# Patient Record
Sex: MALE | Race: WHITE | URBAN - METROPOLITAN AREA
[De-identification: names, ages, dates, MRNs, and addresses within clinical notes are randomized per-mention and may not be internally consistent; named-entity substitution may affect disease eponyms.]

---

## 2024-03-12 ENCOUNTER — APPOINTMENT (OUTPATIENT)
Dept: NEUROLOGY | Facility: HOSPITAL | Age: 69
End: 2024-03-12
Payer: MEDICARE

## 2024-03-13 ENCOUNTER — OFFICE VISIT (OUTPATIENT)
Dept: NEUROLOGY | Facility: HOSPITAL | Age: 69
End: 2024-03-13
Payer: MEDICARE

## 2024-03-13 VITALS
SYSTOLIC BLOOD PRESSURE: 129 MMHG | DIASTOLIC BLOOD PRESSURE: 83 MMHG | WEIGHT: 165 LBS | TEMPERATURE: 97.1 F | RESPIRATION RATE: 18 BRPM | HEART RATE: 67 BPM | BODY MASS INDEX: 25.01 KG/M2 | HEIGHT: 68 IN

## 2024-03-13 DIAGNOSIS — R42 DIZZINESS: Primary | ICD-10-CM

## 2024-03-13 PROCEDURE — 99205 OFFICE O/P NEW HI 60 MIN: CPT

## 2024-03-13 PROCEDURE — 1160F RVW MEDS BY RX/DR IN RCRD: CPT

## 2024-03-13 PROCEDURE — 1159F MED LIST DOCD IN RCRD: CPT

## 2024-03-13 PROCEDURE — G2211 COMPLEX E/M VISIT ADD ON: HCPCS | Performed by: STUDENT IN AN ORGANIZED HEALTH CARE EDUCATION/TRAINING PROGRAM

## 2024-03-13 PROCEDURE — 1036F TOBACCO NON-USER: CPT

## 2024-03-13 PROCEDURE — 99215 OFFICE O/P EST HI 40 MIN: CPT | Mod: GC

## 2024-03-13 PROCEDURE — 1126F AMNT PAIN NOTED NONE PRSNT: CPT

## 2024-03-13 RX ORDER — VALACYCLOVIR HYDROCHLORIDE 500 MG/1
500 TABLET, FILM COATED ORAL AS NEEDED
COMMUNITY

## 2024-03-13 RX ORDER — LISINOPRIL 20 MG/1
20 TABLET ORAL DAILY
COMMUNITY

## 2024-03-13 RX ORDER — TRAMADOL HYDROCHLORIDE 50 MG/1
TABLET ORAL
COMMUNITY

## 2024-03-13 RX ORDER — DEXTROAMPHETAMINE SACCHARATE, AMPHETAMINE ASPARTATE, DEXTROAMPHETAMINE SULFATE AND AMPHETAMINE SULFATE 2.5; 2.5; 2.5; 2.5 MG/1; MG/1; MG/1; MG/1
10 TABLET ORAL 2 TIMES DAILY
COMMUNITY

## 2024-03-13 RX ORDER — DICLOFENAC SODIUM 50 MG/1
50 TABLET, DELAYED RELEASE ORAL 2 TIMES DAILY
COMMUNITY

## 2024-03-13 RX ORDER — VITAMIN E MIXED 400 UNIT
CAPSULE ORAL DAILY
COMMUNITY

## 2024-03-13 RX ORDER — TESTOSTERONE CYPIONATE 1000 MG/10ML
INJECTION, SOLUTION INTRAMUSCULAR
COMMUNITY

## 2024-03-13 RX ORDER — METOPROLOL SUCCINATE 50 MG/1
50 TABLET, EXTENDED RELEASE ORAL 2 TIMES DAILY
COMMUNITY

## 2024-03-13 RX ORDER — PREDNISONE 5 MG/1
5 TABLET ORAL DAILY PRN
COMMUNITY

## 2024-03-13 ASSESSMENT — ENCOUNTER SYMPTOMS
LOSS OF SENSATION IN FEET: 0
OCCASIONAL FEELINGS OF UNSTEADINESS: 0
DEPRESSION: 0

## 2024-03-13 ASSESSMENT — PAIN SCALES - GENERAL: PAINLEVEL: 0-NO PAIN

## 2024-03-13 NOTE — PATIENT INSTRUCTIONS
Thank you for your visit today. You were seen by Dr. Chaidez for dizziness, possibly related to BPPV though this was not seen on exam today. If you have any questions or need to reach me, call my office at [phone].    We discussed the following plan today:  Vestibular therapy referral  Return in 4 months

## 2024-03-13 NOTE — PROGRESS NOTES
"Subjective     Ezequiel Valderrama is a right handed  68 y.o. year old male who presents with No chief complaint on file..   Visit type: new patient visit     3 years ago he had a bout of episodic vertigo which he was told is related to BPPV, this improved with self-Epley maneuvers. Review of [facility] note from March 2021 noted positive larissa-hallpike on left. He was rid of vertigo for 3 years ago but it came back 3 months ago. Right now when it is severe it feels like lightheadedness without spinning. The most severe portion lasts for a few ~20 seconds at a time and is followed by a more prolonged period of mild unsteadiness. It is brought on the most by tilting head forward. It is not caused by turning head in bed nor looking upwards. He does not get any other symptoms besides nausea. This has been occurring daily for past 3 months. He denies orthostasis.    He has constant high pitched tinnitus which may be a separate issue. He has right-sided hearing loss. He had audiology at [facility] which showed \"high-frequency sensorineural hearing loss, with the very good speech discrimination\". 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 No aural fullness. He denies oscillopsia. He does get very mild headaches, no hx of migraine. There is no concurrent photophobia, phonophobia, or visual aura.    He has chronic numbness in his toes. The distribution has been stable/nonprogressive. Saw Dr. Lincoln for MCI and small-fiber neuropathy as well as DHAVAL treated with CPAP. He has a previous history of alcoholism for 20 years but has been sober past 10 years.    He sees a rheumatologist for symptoms of arthralgia, at one point told he has something ?autoimmune but not on treatment.    MRI brain [facility] Dec 2023 was reviewed showing low burden of small vessel ischemic changes, mild global atrophy. There may be a vascular loop abutting left CN7/8 complex but slices are too thick to be sure.    There is no problem list on file for this " "patient.     No past medical history on file.   No past surgical history on file.   Social History     Socioeconomic History    Marital status: Single     Spouse name: Not on file    Number of children: Not on file    Years of education: Not on file    Highest education level: Not on file   Occupational History    Not on file   Tobacco Use    Smoking status: Never    Smokeless tobacco: Former     Types: Chew   Substance and Sexual Activity    Alcohol use: Not on file    Drug use: Not on file    Sexual activity: Not on file   Other Topics Concern    Not on file   Social History Narrative    Not on file     Social Determinants of Health     Financial Resource Strain: Not on file   Food Insecurity: Not on file   Transportation Needs: Not on file   Physical Activity: Not on file   Stress: Not on file   Social Connections: Not on file   Intimate Partner Violence: Not on file   Housing Stability: Not on file      No family history on file.              Review of Systems  All other system have been reviewed and are negative for complaint.    Vitals:    03/13/24 1241   BP: 129/83   Pulse: 67   Resp: 18   Temp: 36.2 °C (97.1 °F)   Weight: 74.8 kg (165 lb)   Height: 1.727 m (5' 8\")     Objective   Neurological Exam  Mental Status  Awake, alert and oriented to person, place and time. Speech is normal. Language is fluent with no aphasia. Attention and concentration are normal.    Cranial Nerves  CN II: Visual fields full to confrontation.  CN III, IV, VI: Extraocular movements intact bilaterally. Normal saccades. Normal smooth pursuit. Normal lids and orbits bilaterally. Pupils equal round and reactive to light bilaterally.  CN V:  Right: Facial sensation is normal.  Left: Facial sensation is normal on the left.  CN VII: Full and symmetric facial movement.  CN VIII: Hearing is normal.  CN IX, X: Palate elevates symmetrically  CN XI: Shoulder shrug strength is normal.  CN XII: Tongue midline without atrophy or " fasciculations.  VOR normal  Anastasia-hallpike normal  Supine head roll normal  Forward head tilt normal.    Motor  Normal muscle bulk throughout. No fasciculations present. Normal muscle tone. No abnormal involuntary movements.                                               Right                     Left   Shoulder abduction               5                          5  Elbow flexion                         5                          5  Elbow extension                    5                          5  Finger flexion                         5                          5  Finger extension                    5                          5  Finger abduction                    5                          5  Hip flexion                              5                          5  Knee flexion                           5                          5  Plantarflexion                         5                          5  Dorsiflexion                            5                          5    Sensory  Light touch is normal in upper and lower extremities. Normal vibration in distal lower extremities.     Reflexes                                            Right                      Left  Biceps                                 1+                         1+  Triceps                                1+                         1+  Patellar                                1+                         1+  Achilles                                0                         0  Right Plantar: downgoing  Left Plantar: downgoing    Coordination  Right: Finger-to-nose normal. Heel-to-shin normal.Left: Finger-to-nose normal. Heel-to-shin normal.    Gait  Casual gait is normal including stance, stride, and arm swing. Normal tandem gait. Romberg is absent.                                   Assessment/Plan   Diagnoses and all orders for this visit:  Dizziness  -     Referral to Physical Therapy; Future  -     Follow Up In Neurology; Future    Ezequiel Valderrama is a 68 y.o.  year old male with HTN, ADHD, MCI, previous history of alcoholism, possible small fiber neuropathy, DHAVAL on CPAP, previous left BPPV, bilateral sensorineural hearing here for evaluation of episodic dizziness. The description may be consistent with recurrent BPPV, however his exam today is unremarkable. I discussed it may remit spontaneously or with treatment. MRI brain [facility] Dec 2023 was reviewed showing low burden of small vessel ischemic changes, mild global atrophy. There may be a vascular loop abutting left CN7/8 complex but slices are too thick to be sure.  is another consideration and may trial lacosamide if the episodes persist.

## 2024-03-19 ENCOUNTER — HOSPITAL ENCOUNTER (OUTPATIENT)
Dept: RADIOLOGY | Facility: EXTERNAL LOCATION | Age: 69
Discharge: HOME | End: 2024-03-19

## 2024-04-10 ENCOUNTER — CLINICAL SUPPORT (OUTPATIENT)
Dept: PHYSICAL THERAPY | Facility: CLINIC | Age: 69
End: 2024-04-10
Payer: MEDICARE

## 2024-04-10 DIAGNOSIS — R42 DIZZINESS: ICD-10-CM

## 2024-04-10 PROCEDURE — 97110 THERAPEUTIC EXERCISES: CPT | Mod: GP | Performed by: PHYSICAL THERAPIST

## 2024-04-10 PROCEDURE — 97161 PT EVAL LOW COMPLEX 20 MIN: CPT | Mod: GP | Performed by: PHYSICAL THERAPIST

## 2024-04-10 ASSESSMENT — ENCOUNTER SYMPTOMS
LOSS OF SENSATION IN FEET: 0
OCCASIONAL FEELINGS OF UNSTEADINESS: 0
DEPRESSION: 0

## 2024-04-10 ASSESSMENT — PATIENT HEALTH QUESTIONNAIRE - PHQ9
SUM OF ALL RESPONSES TO PHQ QUESTIONS 1-9: 14
5. POOR APPETITE OR OVEREATING: NOT AT ALL
4. FEELING TIRED OR HAVING LITTLE ENERGY: MORE THAN HALF THE DAYS
7. TROUBLE CONCENTRATING ON THINGS, SUCH AS READING THE NEWSPAPER OR WATCHING TELEVISION: MORE THAN HALF THE DAYS
6. FEELING BAD ABOUT YOURSELF - OR THAT YOU ARE A FAILURE OR HAVE LET YOURSELF OR YOUR FAMILY DOWN: MORE THAN HALF THE DAYS
9. THOUGHTS THAT YOU WOULD BE BETTER OFF DEAD, OR OF HURTING YOURSELF: NOT AT ALL
2. FEELING DOWN, DEPRESSED OR HOPELESS: MORE THAN HALF THE DAYS
10. IF YOU CHECKED OFF ANY PROBLEMS, HOW DIFFICULT HAVE THESE PROBLEMS MADE IT FOR YOU TO DO YOUR WORK, TAKE CARE OF THINGS AT HOME, OR GET ALONG WITH OTHER PEOPLE: SOMEWHAT DIFFICULT
SUM OF ALL RESPONSES TO PHQ9 QUESTIONS 1 AND 2: 5
3. TROUBLE FALLING OR STAYING ASLEEP OR SLEEPING TOO MUCH: SEVERAL DAYS
1. LITTLE INTEREST OR PLEASURE IN DOING THINGS: NEARLY EVERY DAY
8. MOVING OR SPEAKING SO SLOWLY THAT OTHER PEOPLE COULD HAVE NOTICED. OR THE OPPOSITE, BEING SO FIGETY OR RESTLESS THAT YOU HAVE BEEN MOVING AROUND A LOT MORE THAN USUAL: MORE THAN HALF THE DAYS

## 2024-04-10 ASSESSMENT — PAIN SCALES - GENERAL: PAINLEVEL_OUTOF10: 0 - NO PAIN

## 2024-04-10 ASSESSMENT — PAIN - FUNCTIONAL ASSESSMENT: PAIN_FUNCTIONAL_ASSESSMENT: 0-10

## 2024-04-10 NOTE — PROGRESS NOTES
Physical Therapy    Physical Therapy Vertigo Evaluation    Patient Name: Ezequiel Valderrama  MRN: [id number]  Today's Date: 4/10/2024  Time Calculation  Start Time: 1620  Stop Time: 1720  Time Calculation (min): 60 min  PT Evaluation Time Entry  PT Evaluation (Low) Time Entry: 50  PT Therapeutic Procedures Time Entry  Therapeutic Exercise Time Entry: 10  Payor: SAMYMARILYN MEDICARE / Plan: AET MEDICARE VALUE PLAN / Product Type: *No Product type* /     Reason for Referral: vertigo  General Comment: [place]    Current Problem  Problem List Items Addressed This Visit             ICD-10-CM    Dizziness R42    Relevant Orders    Follow Up In Physical Therapy          Precautions  Precautions  STEADI Fall Risk Score (The score of 4 or more indicates an increased risk of falling): 0  Precautions Comment: none       Pain  Pain Assessment: 0-10  Pain Score: 0 - No pain    SUBJECTIVE:   Chief complaint:  Pt reports he had an onset of vertigo about 2-3 months ago. Notes symptoms of imbalance and a sense of movement. Notes symptoms are with him constantly. Denies any falls at this time. Note she had hx of vertigo a few years ago when getting in out of bed and performed self Epley's and resolved his issues. No vertigo since then until 3 months ago. Notes eye movements and head movements will cause his symptoms too worsen. No issues with positional changes at this time.     Vertigo Better: sitting     Vertigo Worse: walking/standing     Imaging: MRI brain [facility] Dec 2023 was reviewed showing low burden of small vessel ischemic changes, mild global atrophy. There may be a vascular loop abutting left CN7/8 complex but slices are too thick to be sure.     Prior level of function: No prior vertigo or imbalance   Current limitations: walking, driving, imbalance     Home setup: reviewed an no concern     Work: retired    Patients goal: eliminate vertigo     Prior tx: no prior PT tx this year.     Medical hx has been reviewed with the patient.      OBJECTIVE:  Posture: WNL    ROM: WNL    Vertigo:   BPPV Test Right Left  Symptoms Nystagmus Direction Latency   (in sec) Duration (in sec)   Thrust neg neg       Hallpike [place] neg neg       Roll test neg neg         Oculomotor exam:  Test Abnormal Y/N Symptoms  Dizziness Rating (0-10)   Smooth Pursuits  N     Saccades Horizontal N     Saccades Vertical N     VOR-Horizontal Y Vertigo/Unsteadiness 2-3/10    VOR-Vertical Y Vertigo/unsteadiness 2/10   Head Shake horizontal N     Head Shake Vertical  N       Spontaneous Nystagmus: neg   Gaze evoked nystagmus: neg     ModCTSIB: 120/120-increased sway and condition 3 and 4     FGA - Functional Gait Assessment  Gait level surface: 2  Change in gait speed: 3  Gait with horizontal head turns: 2  Gait with vertical head turns: 2  Gait and pivot turn: 3  Step over obstacle: 3  Gait with narrow base of support: 2  Gait with eyes closed: 2  Ambulating backwards: 3  Steps: 3  FGA Total Score: 25    Other Measures  Dizziness Handicap Inventory: 58/100     TREATMENT:  Eval  Access Code: [email or web address]  URL: [email or web address]  Date: 04/10/2024  Prepared by: PILY Duogn    Exercises  - Seated Horizontal Saccades  - 2 x daily - 7 x weekly - 1 sets - 3 reps - 30 sec hold  - Seated Vertical Saccades  - 2 x daily - 7 x weekly - 1 sets - 3 reps - 30 sec hold  - Seated Gaze Stabilization with Head Rotation  - 2 x daily - 7 x weekly - 1 sets - 3 reps - 30 sec  hold  - Seated Gaze Stabilization with Head Nod  - 2 x daily - 7 x weekly - 1 sets - 3 reps - 30 sec hold  - Romberg Stance  - 2 x daily - 7 x weekly - 1 sets - 3 reps - 30 sec hold  - Seated to Fold Over Vestibular Habituation  - 2 x daily - 7 x weekly - 2 sets - 5 reps    ASSESSEMENT:  Pt is a 68 yr old referred to therapy for a dx of vertigo by Dr. Jose Chaidez. Pt presents with signs and symptoms of dizziness with positive testing and symptoms reproduction with oculomotor and balance tests in the clinic.  This patient would benefit from a vestibular physical therapy program to restore priro level of function, reduce vertigo like symptoms and improve gait/balance.   Complexity: Low  Clinical presentation: Stable and/or uncomplicated characteristics  The physical therapy prognosis is good for the patient to achieve their goals.   The pt tolerated therapy treatment today well with no adverse effects.  Personal Factors/Barriers to therapy include:  anxiety/depression     PLAN:  The pt will be seen 1 days a week for 6-8 weeks.      The pt has been educated about the risks and benefits of physical therapy including manual therapy treatments. Pt agrees with POC and gives consent for treatment.     The patient will benefit from physical therapy treatment to include: therapeutic exercises, therapeutic activities, neurological re-education, and a home exercise program.     Goals:  Active       PT Problem       Pt to report reduced vertigo symptoms by 25-50% or more with all prior movements and ADL's.        Start:  04/10/24    Expected End:  05/01/24            Pt to report reduced vertigo symptoms by 75% or more with all prior movements and ADL's.        Start:  04/10/24    Expected End:  05/22/24            Reduce DHI by 10 to 20 points to display improved emotional, physical and functional aspects of vertigo        Start:  04/10/24    Expected End:  05/22/24            Pt to increase FGA score to > 28 points to display overall improve gait and balance.         Start:  04/10/24    Expected End:  05/22/24            Pt to be independent with a HEP for self management.        Start:  04/10/24    Expected End:  05/22/24

## 2024-04-16 ENCOUNTER — TREATMENT (OUTPATIENT)
Dept: PHYSICAL THERAPY | Facility: CLINIC | Age: 69
End: 2024-04-16
Payer: MEDICARE

## 2024-04-16 DIAGNOSIS — R42 DIZZINESS: Primary | ICD-10-CM

## 2024-04-16 PROCEDURE — 97112 NEUROMUSCULAR REEDUCATION: CPT | Mod: GP | Performed by: PHYSICAL THERAPIST

## 2024-04-16 ASSESSMENT — PAIN SCALES - GENERAL: PAINLEVEL_OUTOF10: 0 - NO PAIN

## 2024-04-16 NOTE — PROGRESS NOTES
Physical Therapy    Physical Therapy Treatment    Patient Name: Ezequiel Valderrama  MRN: [id number]  Today's Date: 4/16/2024  Time Calculation  Start Time: 1045  Stop Time: 1130  Time Calculation (min): 45 min  PT Therapeutic Procedures Time Entry  Neuromuscular Re-Education Time Entry: 40  Therapeutic Exercise Time Entry: 5  Payor: SAMYDILANMARILYN MEDICARE / Plan: AETNA MEDICARE VALUE PLAN / Product Type: *No Product type* /     Reason for Referral: vertigo  General Comment: Visit 2 [place]    Current Problem    Problem List Items Addressed This Visit             ICD-10-CM    Dizziness - Primary R42        Subjective   Pt reports overall feeling about the same. Notes no issues with the HEP. Notes getting symptoms more in the evenings.   Compliance with HEP-yes     Precautions  Precautions  Precautions Comment: none    Pain  Pain Score: 0 - No pain      Objective   No measures today     Treatments:  There-ex:  Nu-step x 5 min    Neuro re-ed:  Airex march x 2 min  Airex tandem stand x 1 min  Airex step on/off forward 10 x ea R/L  Airex step on/off lateral 10 x ea R/L  Romberg eyes closed 30 sec x 3   1/4 turns  4 x R/L x 2   Diagonal bends 2 x 5 reps R/L each   Tandem walk at rail 10 ft x 4   Retro walk 10 ft x 4  Saccades standing romberg 30 sec x 3 ea horizontal/vertical   Gaze stability standing romberg 30 sec x 3 ea horizontal/vertical  CVOR 30 sec x 3     Updated HEP:  Access Code: [email or web address]  URL: [email or web address]  Date: 04/16/2024  Prepared by: PILY Duong    Exercises  - Seated Horizontal Saccades  - 2 x daily - 7 x weekly - 1 sets - 3 reps - 30 sec hold  - Seated Vertical Saccades  - 2 x daily - 7 x weekly - 1 sets - 3 reps - 30 sec hold  - Seated Gaze Stabilization with Head Rotation  - 2 x daily - 7 x weekly - 1 sets - 3 reps - 30 sec  hold  - Seated Gaze Stabilization with Head Nod  - 2 x daily - 7 x weekly - 1 sets - 3 reps - 30 sec hold  - Romberg Stance  - 2 x daily - 7 x weekly - 1 sets - 3 reps -  30 sec hold  - Seated to Fold Over Vestibular Habituation  - 2 x daily - 7 x weekly - 2 sets - 5 reps  - Standing VOR Cancellation  - 2 x daily - 7 x weekly - 1 sets - 3 reps - 30 sec hold  - Tandem Stance  - 2 x daily - 7 x weekly - 1 sets - 2 reps - 30 sec  hold  - Romberg Stance with Eyes Closed  - 1 x daily - 7 x weekly - 1 sets - 3 reps - 30 sec hold     Assessment:  Pt overall is progressing with his therapy program and noted with less symptoms with exercises performed today. Provided updated Hep and recommended to continue with HEP as directed.     Plan:  Continue to progress program as tolerated.     Goals:  Active       PT Problem       Pt to report reduced vertigo symptoms by 25-50% or more with all prior movements and ADL's.        Start:  04/10/24    Expected End:  05/01/24            Pt to report reduced vertigo symptoms by 75% or more with all prior movements and ADL's.        Start:  04/10/24    Expected End:  05/22/24            Reduce DHI by 10 to 20 points to display improved emotional, physical and functional aspects of vertigo        Start:  04/10/24    Expected End:  05/22/24            Pt to increase FGA score to > 28 points to display overall improve gait and balance.         Start:  04/10/24    Expected End:  05/22/24            Pt to be independent with a HEP for self management.        Start:  04/10/24    Expected End:  05/22/24

## 2024-04-25 ENCOUNTER — TREATMENT (OUTPATIENT)
Dept: PHYSICAL THERAPY | Facility: CLINIC | Age: 69
End: 2024-04-25
Payer: MEDICARE

## 2024-04-25 DIAGNOSIS — R42 DIZZINESS: Primary | ICD-10-CM

## 2024-04-25 PROCEDURE — 97112 NEUROMUSCULAR REEDUCATION: CPT | Mod: GP | Performed by: PHYSICAL THERAPIST

## 2024-04-25 ASSESSMENT — PAIN SCALES - GENERAL: PAINLEVEL_OUTOF10: 0 - NO PAIN

## 2024-04-25 NOTE — PROGRESS NOTES
Physical Therapy    Physical Therapy Treatment    Patient Name: Ezequiel Valderrama  MRN: [id number]  Today's Date: 4/25/2024  Time Calculation  Start Time: 1230  Stop Time: 1315  Time Calculation (min): 45 min  PT Therapeutic Procedures Time Entry  Neuromuscular Re-Education Time Entry: 40  Therapeutic Exercise Time Entry: 5  Payor: SAMYDILANMARILYN MEDICARE / Plan: AETNA MEDICARE VALUE PLAN / Product Type: *No Product type* /     Reason for Referral: vertigo  General Comment: Visit 3 [place]    Current Problem    Problem List Items Addressed This Visit             ICD-10-CM    Dizziness - Primary R42      Subjective   Pt overall notes he feels he is improving and that the exercises   Compliance with HEP-yes    Precautions  Precautions  Precautions Comment: none    Pain  Pain Score: 0 - No pain    Objective   No measures     Treatments:  There-ex:  Nu-step x 5 min    Neuro re-ed:  Airex march x 2 min  Airex tandem stand x 1 min  Airex step on/off forward 10 x ea R/L  Airex step on/off lateral 10 x ea R/L  Airex on/off step up backward 10 x ea R/L   Romberg eyes closed 30 sec x 3 Airex   1/4 turns  4 x R/L x 2   Diagonal bends 2 x 5 reps R/L each   Tandem walk at rail 10 ft x 4   Retro walk 10 ft x 4  Saccades standing romberg 30 sec x 3 ea horizontal/vertical -HEP  Gaze stability standing romberg 30 sec x 3 ea horizontal/vertical- HEP  Walk with head turns R/L 20ft x 4  Walk with head nods up/down 20ft x 4   CVOR 30 sec x 3     Updated HEP:  Access Code: [email or web address]  URL: [email or web address]  Date: 04/25/2024  Prepared by: PILY Duong    Exercises  - Seated Horizontal Saccades  - 2 x daily - 7 x weekly - 1 sets - 3 reps - 30 sec hold  - Seated Vertical Saccades  - 2 x daily - 7 x weekly - 1 sets - 3 reps - 30 sec hold  - Seated Gaze Stabilization with Head Rotation  - 2 x daily - 7 x weekly - 1 sets - 3 reps - 30 sec  hold  - Seated Gaze Stabilization with Head Nod  - 2 x daily - 7 x weekly - 1 sets - 3 reps - 30 sec  hold  - Romberg Stance  - 2 x daily - 7 x weekly - 1 sets - 3 reps - 30 sec hold  - Seated to Fold Over Vestibular Habituation  - 2 x daily - 7 x weekly - 2 sets - 5 reps  - Standing VOR Cancellation  - 2 x daily - 7 x weekly - 1 sets - 3 reps - 30 sec hold  - Tandem Stance  - 2 x daily - 7 x weekly - 1 sets - 2 reps - 30 sec  hold  - Romberg Stance with Eyes Closed  - 1 x daily - 7 x weekly - 1 sets - 3 reps - 30 sec hold  - Standing with Head Rotation  - 1 x daily - 7 x weekly - 1 sets - 3 reps  - Standing with Head Nod  - 1 x daily - 7 x weekly - 1 sets - 3 reps  - Walking with Head Rotation  - 1 x daily - 7 x weekly  - Walking with Head Nod  - 1 x daily - 7 x weekly    Assessment:  Pt overall continues to progress with is gait and balance form his vertigo with no significant issues noted. Doing well with activity performed. Updated HEP again for home.     Plan:  Continue 1 more visits next week with re-check.     Goals:  Active       PT Problem       Pt to report reduced vertigo symptoms by 25-50% or more with all prior movements and ADL's.        Start:  04/10/24    Expected End:  05/01/24            Pt to report reduced vertigo symptoms by 75% or more with all prior movements and ADL's.        Start:  04/10/24    Expected End:  05/22/24            Reduce DHI by 10 to 20 points to display improved emotional, physical and functional aspects of vertigo        Start:  04/10/24    Expected End:  05/22/24            Pt to increase FGA score to > 28 points to display overall improve gait and balance.         Start:  04/10/24    Expected End:  05/22/24            Pt to be independent with a HEP for self management.        Start:  04/10/24    Expected End:  05/22/24

## 2024-05-02 ENCOUNTER — TREATMENT (OUTPATIENT)
Dept: PHYSICAL THERAPY | Facility: CLINIC | Age: 69
End: 2024-05-02
Payer: MEDICARE

## 2024-05-02 DIAGNOSIS — R42 DIZZINESS: ICD-10-CM

## 2024-05-02 PROCEDURE — 97112 NEUROMUSCULAR REEDUCATION: CPT | Mod: GP | Performed by: PHYSICAL THERAPIST

## 2024-05-02 ASSESSMENT — PAIN - FUNCTIONAL ASSESSMENT: PAIN_FUNCTIONAL_ASSESSMENT: 0-10

## 2024-05-02 ASSESSMENT — PAIN SCALES - GENERAL: PAINLEVEL_OUTOF10: 5 - MODERATE PAIN

## 2024-05-02 NOTE — PROGRESS NOTES
Physical Therapy    Physical Therapy Treatment/Recheck     Patient Name: Ezequiel Valderrama  MRN: [id number]  Today's Date: 5/2/2024  Time Calculation  Start Time: 1230  Stop Time: 1315  Time Calculation (min): 45 min  PT Therapeutic Procedures Time Entry  Neuromuscular Re-Education Time Entry: 40  Therapeutic Exercise Time Entry: 5  Payor: [employer] / Plan: [employer] MED[employer]ARE VALUE PLAN / Product Type: *No Product type* /     Reason for Referral: vertigo  General Comment: Visit 5 of [place]    Current Problem    Problem List Items Addressed This Visit             ICD-10-CM    Dizziness R42      Subjective   Pt overall notes he feels he is still having balance and gait issues. Notes also feeling more arthritic pain today as well throughout his body.   Compliance with HEP-yes    Precautions  Precautions  Precautions Comment: none    Pain  Pain Assessment: 0-10  Pain Score: 5 - Moderate pain  Pain Location:  (all over)    Objective     Oculomotor exam:  Test Abnormal Y/N Symptoms  Dizziness Rating (0-10)   Smooth Pursuits  N     Saccades Horizontal N     Saccades Vertical N     VOR-Horizontal Y Vertigo/Unsteadiness 2/10    VOR-Vertical Y Vertigo/unsteadiness 2/10   Head Shake horizontal N     Head Shake Vertical  N       ModCTSIB: 120/120-increased sway on condition 4     FGA - Functional Gait Assessment  Gait level surface: 2  Change in gait speed: 3  Gait with horizontal head turns: 3  Gait with vertical head turns: 2  Gait and pivot turn: 3  Step over obstacle: 3  Gait with narrow base of support: 2  Gait with eyes closed: 2  Ambulating backwards: 3  Steps: 3  FGA Total Score: 26    Other Measures  Dizziness Handicap Inventory: 48/80     Treatments:  There-ex:  Nu-step x 5 min    Neuro re-ed:  Airex march x 2 min  Airex tandem stand x 1 min  Airex step on/off forward 10 x ea R/L  Airex step on/off lateral 10 x ea R/L  Airex on/off step up backward 10 x ea R/L   Romberg eyes closed 30 sec x 3 Airex   1/4 turns  4 x R/L x 2    Diagonal bends 2 x 5 reps R/L each   Tandem walk at rail 10 ft x 4   Retro walk 10 ft x 4  Saccades standing romberg 30 sec x 3 ea horizontal/vertical -HEP  Gaze stability standing romberg 30 sec x 3 ea horizontal/vertical- HEP  Walk with head turns R/L 20ft x 4  Walk with head nods up/down 20ft x 4   CVOR 30 sec x 3     Updated HEP:  Access Code: [email or web address]  URL: [email or web address]  Date: 04/25/2024  Prepared by:  Rhoda    Exercises  - Seated Horizontal Saccades  - 2 x daily - 7 x weekly - 1 sets - 3 reps - 30 sec hold  - Seated Vertical Saccades  - 2 x daily - 7 x weekly - 1 sets - 3 reps - 30 sec hold  - Seated Gaze Stabilization with Head Rotation  - 2 x daily - 7 x weekly - 1 sets - 3 reps - 30 sec  hold  - Seated Gaze Stabilization with Head Nod  - 2 x daily - 7 x weekly - 1 sets - 3 reps - 30 sec hold  - Romberg Stance  - 2 x daily - 7 x weekly - 1 sets - 3 reps - 30 sec hold  - Seated to Fold Over Vestibular Habituation  - 2 x daily - 7 x weekly - 2 sets - 5 reps  - Standing VOR Cancellation  - 2 x daily - 7 x weekly - 1 sets - 3 reps - 30 sec hold  - Tandem Stance  - 2 x daily - 7 x weekly - 1 sets - 2 reps - 30 sec  hold  - Romberg Stance with Eyes Closed  - 1 x daily - 7 x weekly - 1 sets - 3 reps - 30 sec hold  - Standing with Head Rotation  - 1 x daily - 7 x weekly - 1 sets - 3 reps  - Standing with Head Nod  - 1 x daily - 7 x weekly - 1 sets - 3 reps  - Walking with Head Rotation  - 1 x daily - 7 x weekly  - Walking with Head Nod  - 1 x daily - 7 x weekly    Assessment:   Pt overall has shown some improvements in his overall gait and balance with some reduction in his vestibular symptoms but are still occurring and pt would like to return to his neuro for further testing and referral. Pt has met approx 30-40% of his goals thus far.     Plan:   Hold patient care for 30 days to allow for follow up with neuro and if patient does not return will plan to discharge at that  time.     Goals:  Active       PT Problem       Pt to report reduced vertigo symptoms by 25-50% or more with all prior movements and ADL's.  (Progressing)       Start:  04/10/24    Expected End:  05/01/24            Pt to report reduced vertigo symptoms by 75% or more with all prior movements and ADL's.  (Progressing)       Start:  04/10/24    Expected End:  05/22/24            Reduce DHI by 10 to 20 points to display improved emotional, physical and functional aspects of vertigo  (Met)       Start:  04/10/24    Expected End:  05/22/24    Resolved:  05/02/24         Pt to increase FGA score to > 28 points to display overall improve gait and balance.   (Progressing)       Start:  04/10/24    Expected End:  05/22/24            Pt to be independent with a HEP for self management.  (Progressing)       Start:  04/10/24    Expected End:  05/22/24

## 2024-06-03 ENCOUNTER — DOCUMENTATION (OUTPATIENT)
Dept: PHYSICAL THERAPY | Facility: CLINIC | Age: 69
End: 2024-06-03
Payer: MEDICARE

## 2024-06-03 NOTE — PROGRESS NOTES
Physical Therapy    Discharge Summary    Name: Ezequiel Valderrama  MRN: [id number]  : 1955  Date: 24    Discharge Summary: PT    Discharge Information: Date of discharge 6/3/24, Date of last visit 24, Date of evaluation 24, Number of attended visits 4, Referred by Jose Chaidez, and Referred for vertigo     Therapy Summary: Pt was sen for 4 visits in therapy for his vertigo with adaptation, habituation and balance exercises. Pt did have improvements.     Discharge Status: Pt last seen on 24 and place don hold as he was improving but wanted to follow up with neuro regarding his symptoms.      Rehab Discharge Reason: Patient requested due to wanting to follow up with neuro regarding his symptoms.

## 2024-06-20 ENCOUNTER — APPOINTMENT (OUTPATIENT)
Dept: NEUROLOGY | Facility: CLINIC | Age: 69
End: 2024-06-20
Payer: MEDICARE

## 2024-07-09 ENCOUNTER — APPOINTMENT (OUTPATIENT)
Dept: NEUROLOGY | Facility: CLINIC | Age: 69
End: 2024-07-09
Payer: MEDICARE